# Patient Record
Sex: FEMALE
[De-identification: names, ages, dates, MRNs, and addresses within clinical notes are randomized per-mention and may not be internally consistent; named-entity substitution may affect disease eponyms.]

---

## 2022-03-09 ENCOUNTER — DASHBOARD ENCOUNTERS (OUTPATIENT)
Age: 42
End: 2022-03-09

## 2022-03-09 ENCOUNTER — OFFICE VISIT (OUTPATIENT)
Dept: URBAN - NONMETROPOLITAN AREA CLINIC 4 | Facility: CLINIC | Age: 42
End: 2022-03-09
Payer: COMMERCIAL

## 2022-03-09 DIAGNOSIS — K59.09 CHANGE IN BOWEL MOVEMENTS INTERMITTENT CONSTIPATION. URGENCY IN THE MORNING.: ICD-10-CM

## 2022-03-09 DIAGNOSIS — R10.84 GENERALIZED ABDOMINAL PAIN: ICD-10-CM

## 2022-03-09 PROBLEM — 14760008: Status: ACTIVE | Noted: 2022-03-09

## 2022-03-09 PROCEDURE — 99244 OFF/OP CNSLTJ NEW/EST MOD 40: CPT | Performed by: INTERNAL MEDICINE

## 2022-03-09 RX ORDER — PANTOPRAZOLE SODIUM 40 MG/1
1 TABLET TABLET, DELAYED RELEASE ORAL ONCE A DAY
Status: ACTIVE | COMMUNITY

## 2022-03-09 RX ORDER — LINACLOTIDE 72 UG/1
1 CAPSULE AT LEAST 30 MINUTES BEFORE THE FIRST MEAL OF THE DAY ON AN EMPTY STOMACH CAPSULE, GELATIN COATED ORAL ONCE A DAY
Status: ACTIVE | COMMUNITY

## 2022-03-09 RX ORDER — DULAGLUTIDE 0.75 MG/.5ML
AS DIRECTED INJECTION, SOLUTION SUBCUTANEOUS
Status: ACTIVE | COMMUNITY

## 2022-03-09 RX ORDER — LINACLOTIDE 290 UG/1
1 CAPSULE AT LEAST 30 MINUTES BEFORE THE FIRST MEAL OF THE DAY ON AN EMPTY STOMACH CAPSULE, GELATIN COATED ORAL ONCE A DAY
Qty: 90 | Refills: 3 | OUTPATIENT
Start: 2022-03-09 | End: 2023-03-04

## 2022-03-09 RX ORDER — MECLIZINE HCL 25MG 25 MG/1
1 TABLET AS NEEDED TABLET, CHEWABLE ORAL ONCE A DAY
Status: ACTIVE | COMMUNITY

## 2022-03-09 NOTE — HPI-TODAY'S VISIT:
Patient is a 42 yo woman referred by Dr. Soha Gonzalez for above reasons. A copy of this document will be sent to referring provider. She c/o constipation for > 1 year. She was previously diagnosed with ? IBS by someone at [place] and has used Dicylcomine with variable response. The abdominal pain is crampy in nature and lasts few minutes followed by a BM which partially relieves the pain. BM is mixed with Pebble and hard mixed with soft stools. No rectal bleeding. She has associated mild gas and bloating. She can go 2/12 weeks without effective BM. She reports straining but no need for digital manuevers. She uses Flexeril PRN but deneis use of narcotics. She takes Protonix 40 QD for mild GERD related symptoms. She denies pelvic area surgery. She has 3 kids (2 born via vaginal delivery). She has never had pelvic floor testing done. She was recently placed on Linzess 145 QD without relief. She has never had a colonoscopy. No family history of CRC. She is a diabetic since 2015 on Trulicity. She was at  2/9 for similar issues. XRA was unremarkable.

## 2023-05-24 NOTE — PHYSICAL EXAM SKIN:
"Subjective   Patient ID: Eveline Dacosta in personal hygiene products no fever chills no sore joints no systemic symptoms  Did use a new sunscreen last time this happened she thought it might have been from an antibiotic she was on had a rash similarly when she uses sunscreen sometime ago gradually went away now has it wherever she put the sunscreen on her chest neck and upper body        Review of Systems  Constitutional: no chills, no fever and no night sweats.   Eyes: no blurred vision and no eyesight problems.   ENT: no hearing loss, no nasal congestion, no nasal discharge, no hoarseness and no sore throat.   Cardiovascular: no chest pain, no intermittent leg claudication, no lower extremity edema, no palpitations and no syncope.   Respiratory: no cough, no shortness of breath during exertion, no shortness of breath at rest and no wheezing.   Gastrointestinal: no abdominal pain, no blood in stools, no constipation, no diarrhea, no melena, no nausea, no rectal pain and no vomiting.   Genitourinary: no dysuria, no change in urinary frequency, no urinary hesitancy, no feelings of urinary urgency and no vaginal discharge.   Musculoskeletal: no arthralgias,  no back pain and no myalgias.   Integumentary: no new skin lesions and no rashes.   Neurological: no difficulty walking, no headache, no limb weakness, no numbness and no tingling.   Psychiatric: no anxiety, no depression, no anhedonia and no substance use disorders.   Endocrine: no recent weight gain and no recent weight loss.   Hematologic/Lymphatic: no tendency for easy bruising and no swollen glands .    Objective    /77   Pulse 98   Temp 36.4 °C (97.5 °F)   Ht 1.6 m (5' 3\")   Wt 105 kg (231 lb)   BMI 40.92 kg/m²    Physical Exam  The " no rashes , no suspicious lesions , no areas of discoloration , no jaundice present , good turgor , no masses , no tenderness on palpation patient appeared well nourished and normally developed. Vital signs as documented. Head exam is unremarkable. No scleral icterus or corneal arcus noted.  Pupils are equal round reactive to light extraocular movements are intact no hemorrhages noted on funduscopic exam mouth mucous membranes are moist no exudates ears canals clear TMs are gray pearly not injected nose no rhinorrhea or epistaxis Neck is without jugular venous distension, thyromegaly, or carotid bruits. Carotid upstrokes are brisk bilaterally. Lungs are clear to auscultation and percussion. Cardiac exam reveals the PMI to be normally sized and situated. Rhythm is regular. First and second heart sounds normal. No murmurs, rubs or gallops. Abdominal exam reveals normal bowel sounds, no masses, no organomegaly and no aortic enlargement. Extremities are nonedematous and both femoral and pedal pulses are normal.  Neurologic exam DTRs are equal bilaterally no focal deficits strength is symmetrical heme lymph no palpable lymph nodes in the neck axilla or groin erythematous easily blanching rash neck and trunk    Assessment/Plan   Problem List Items Addressed This Visit    None  Visit Diagnoses       Contact dermatitis, unspecified contact dermatitis type, unspecified trigger    -  Primary    Relevant Medications    methylPREDNISolone (Medrol Dospak) 4 mg tablets    triamcinolone acetonide (Kenalog-40) injection 40 mg (Start on 5/24/2023  1:45 PM)             You have a rash most likely contact dermatitis from the application of the sunscreen  I am prescribing prednisone please follow-up if your symptoms do not resolve  I think this is most likely an allergy type reaction and is not contagious please follow-up if symptoms do not improve    Chyna Fulton MD

## 2024-09-13 ENCOUNTER — OFFICE VISIT (OUTPATIENT)
Dept: URBAN - METROPOLITAN AREA CLINIC 54 | Facility: CLINIC | Age: 44
End: 2024-09-13
Payer: COMMERCIAL

## 2024-09-13 VITALS
BODY MASS INDEX: 25.85 KG/M2 | HEART RATE: 114 BPM | TEMPERATURE: 97.5 F | HEIGHT: 64 IN | SYSTOLIC BLOOD PRESSURE: 122 MMHG | DIASTOLIC BLOOD PRESSURE: 94 MMHG | WEIGHT: 151.4 LBS

## 2024-09-13 DIAGNOSIS — R10.84 GENERALIZED ABDOMINAL PAIN: ICD-10-CM

## 2024-09-13 DIAGNOSIS — R11.0 NAUSEA: ICD-10-CM

## 2024-09-13 DIAGNOSIS — K58.1 IRRITABLE BOWEL SYNDROME WITH CONSTIPATION: ICD-10-CM

## 2024-09-13 PROBLEM — 440630006: Status: ACTIVE | Noted: 2024-09-13

## 2024-09-13 PROCEDURE — 99244 OFF/OP CNSLTJ NEW/EST MOD 40: CPT

## 2024-09-13 PROCEDURE — 99214 OFFICE O/P EST MOD 30 MIN: CPT

## 2024-09-13 RX ORDER — PANTOPRAZOLE SODIUM 40 MG/1
1 TABLET TABLET, DELAYED RELEASE ORAL ONCE A DAY
Status: ACTIVE | COMMUNITY

## 2024-09-13 RX ORDER — LINACLOTIDE 145 UG/1
2 CAPSULES CAPSULE, GELATIN COATED ORAL ONCE A DAY
OUTPATIENT

## 2024-09-13 RX ORDER — LINACLOTIDE 145 UG/1
2 CAPSULES CAPSULE, GELATIN COATED ORAL ONCE A DAY
Status: ACTIVE | COMMUNITY

## 2024-09-13 RX ORDER — PROMETHAZINE HYDROCHLORIDE 25 MG/1
1 TABLET AS NEEDED FOR NAUSEA TABLET ORAL
Qty: 30 | Refills: 0 | OUTPATIENT
Start: 2024-09-13 | End: 2024-10-13

## 2024-09-13 RX ORDER — POLYETHYLENE GLYCOL 3350 17 G/17G
1 SCOOP MIXED WITH 8 OUNCES OF FLUID POWDER, FOR SOLUTION ORAL ONCE A DAY
Status: ON HOLD | COMMUNITY

## 2024-09-13 RX ORDER — TENAPANOR HYDROCHLORIDE 53.2 MG/1
1 TABLET IMMEDIATELY BEFORE MEALS TABLET ORAL TWICE A DAY
Qty: 180 TABLET | Refills: 3 | OUTPATIENT
Start: 2024-09-13 | End: 2025-09-08

## 2024-09-13 NOTE — HPI-TODAY'S VISIT:
Patient is a 42 yo woman referred by Dr. Soha Gonzalez for above reasons. A copy of this document will be sent to referring provider. She c/o constipation for > 1 year. She was previously diagnosed with ? IBS by someone at [place] and has used Dicylcomine with variable response. The abdominal pain is crampy in nature and lasts few minutes followed by a BM which partially relieves the pain. BM is mixed with Pebble and hard mixed with soft stools. No rectal bleeding. She has associated mild gas and bloating. She can go 2/12 weeks without effective BM. She reports straining but no need for digital manuevers. She uses Flexeril PRN but deneis use of narcotics. She takes Protonix 40 QD for mild GERD related symptoms. She denies pelvic area surgery. She has 3 kids (2 born via vaginal delivery). She has never had pelvic floor testing done. She was recently placed on Linzess 145 QD without relief. She has never had a colonoscopy. No family history of CRC. She is a diabetic since 2015 on Trulicity. She was at  2/9 for similar issues. XRA was unremarkable.  9/13/24 Follow Up: Patient was referred by TONIA Fields for constipation. A copy of this document will be sent to the provider. Pt continues to have issues with constipation despite taking Linzess 290 with multiple OTC laxatives including magnesium, dulcolax, fiber. Going up to 3 weeks without a BM. No urgency in between. When she finally goes pt has severe abdominal pain with multiple bowel movements. On Ozempic for DM which has worsened GI symptoms, having nausea.

## 2024-11-15 ENCOUNTER — OFFICE VISIT (OUTPATIENT)
Dept: URBAN - METROPOLITAN AREA CLINIC 54 | Facility: CLINIC | Age: 44
End: 2024-11-15

## 2024-11-15 RX ORDER — TENAPANOR HYDROCHLORIDE 53.2 MG/1
1 TABLET IMMEDIATELY BEFORE MEALS TABLET ORAL TWICE A DAY
Qty: 180 TABLET | Refills: 3 | Status: ACTIVE | COMMUNITY
Start: 2024-09-13 | End: 2025-09-08

## 2024-11-15 RX ORDER — PANTOPRAZOLE SODIUM 40 MG/1
1 TABLET TABLET, DELAYED RELEASE ORAL ONCE A DAY
Status: ACTIVE | COMMUNITY

## 2024-11-15 RX ORDER — SEMAGLUTIDE 0.68 MG/ML
AS DIRECTED INJECTION, SOLUTION SUBCUTANEOUS
Status: ACTIVE | COMMUNITY